# Patient Record
Sex: FEMALE | Race: BLACK OR AFRICAN AMERICAN | URBAN - METROPOLITAN AREA
[De-identification: names, ages, dates, MRNs, and addresses within clinical notes are randomized per-mention and may not be internally consistent; named-entity substitution may affect disease eponyms.]

---

## 2018-05-22 ENCOUNTER — OFFICE VISIT (OUTPATIENT)
Dept: ORTHOPEDIC SURGERY | Age: 73
End: 2018-05-22

## 2018-05-22 VITALS
RESPIRATION RATE: 14 BRPM | SYSTOLIC BLOOD PRESSURE: 178 MMHG | BODY MASS INDEX: 32.32 KG/M2 | WEIGHT: 182.4 LBS | HEART RATE: 58 BPM | TEMPERATURE: 98.2 F | DIASTOLIC BLOOD PRESSURE: 81 MMHG | HEIGHT: 63 IN | OXYGEN SATURATION: 97 %

## 2018-05-22 DIAGNOSIS — M17.0 ARTHRITIS OF BOTH KNEES: ICD-10-CM

## 2018-05-22 DIAGNOSIS — M25.561 PAIN IN BOTH KNEES, UNSPECIFIED CHRONICITY: Primary | ICD-10-CM

## 2018-05-22 DIAGNOSIS — M25.562 PAIN IN BOTH KNEES, UNSPECIFIED CHRONICITY: Primary | ICD-10-CM

## 2018-05-22 DIAGNOSIS — M25.462 EFFUSION OF KNEE JOINT, LEFT: ICD-10-CM

## 2018-05-22 RX ORDER — METFORMIN HYDROCHLORIDE 500 MG/1
500 TABLET, EXTENDED RELEASE ORAL 2 TIMES DAILY
COMMUNITY
Start: 2018-02-27

## 2018-05-22 RX ORDER — QUINAPRIL 20 MG/1
20 TABLET ORAL DAILY
COMMUNITY
Start: 2018-02-27

## 2018-05-22 RX ORDER — FLUTICASONE PROPIONATE 50 MCG
2 SPRAY, SUSPENSION (ML) NASAL DAILY
COMMUNITY
Start: 2018-02-22

## 2018-05-22 RX ORDER — TRIAMCINOLONE ACETONIDE 40 MG/ML
40 INJECTION, SUSPENSION INTRA-ARTICULAR; INTRAMUSCULAR ONCE
Qty: 1 ML | Refills: 0
Start: 2018-05-22 | End: 2018-05-22

## 2018-05-22 RX ORDER — ACETAMINOPHEN 500 MG
TABLET ORAL
COMMUNITY

## 2018-05-22 RX ORDER — HYDROCHLOROTHIAZIDE 25 MG/1
25 TABLET ORAL DAILY
COMMUNITY
Start: 2018-02-27

## 2018-05-22 RX ORDER — VERAPAMIL HYDROCHLORIDE 240 MG/1
240 CAPSULE, EXTENDED RELEASE ORAL DAILY
COMMUNITY
Start: 2018-03-01

## 2018-05-22 RX ORDER — LORATADINE 10 MG/1
10 TABLET ORAL DAILY
COMMUNITY

## 2018-05-22 RX ORDER — SIMVASTATIN 20 MG/1
TABLET, FILM COATED ORAL
COMMUNITY

## 2018-05-22 RX ORDER — LEVETIRACETAM 750 MG/1
750 TABLET ORAL 2 TIMES DAILY
COMMUNITY
Start: 2018-02-27

## 2018-05-22 RX ORDER — METOPROLOL TARTRATE 50 MG/1
50 TABLET ORAL 2 TIMES DAILY
COMMUNITY
Start: 2018-02-27

## 2018-05-22 NOTE — PROGRESS NOTES
HISTORY OF PRESENT ILLNESS: Endy Fischer is here for consultation regarding particularly left knee pain. She has known rheumatoid arthritis. She has had one previous Cortisone injection to her knee by a Dr. Luiz Fraser. This helped her a lot. This was many years ago. She now notices swelling in her left knee. She does notice pain with weightbearing. She also has some pain at rest. She states she has known arthritis in her knees but one doctor told her a long time ago never have surgery on your knees. She does use a cane for ambulation assistance. She does not wear a brace on her left knee. PHYSICAL EXAMINATION:  Clinical examination today reveals a 62-year-old, -American female in discomfort. She does ambulate with an antalgic gait with decreased stance phase on the left leg. With reference to her right knee, she has a slight varus deformity of her right knee, which is not quite correctable to the neutral position passively. She has slight palpable medial joint line tenderness. She has no palpable lateral joint line tenderness. She has crepitus in the patellofemoral area of the right knee with flexion and extension. She has good collateral, as well as cruciate ligament stability of the right knee. Gwens test is negative. Lachman test is negative. Anterior and posterior drawer tests are negative. Right hip roll test is negative. With reference to her left knee, she has a significant effusion of her left knee. She lacks about 5? of full active extension due to this effusion. She has further flexion of about 95?, and flexion beyond this is limited secondary to the effusion. She has palpable medial joint line tenderness. She has good collateral, as well as cruciate ligament stability of the left knee. Gwens test is negative. Lachman test is negative. Anterior and posterior drawer tests are negative. She has no left calf tenderness or swelling.  Neurovascular testing is intact to the left lower extremity. Left hip roll test is negative. RADIOGRAPHS: X-rays of both knees today reveal osteoarthritis of both knees. She has osteoarthritis involving the medial compartment of both knees. She has close to bone-on-bone opposition in the medial compartment of both knees on standing AP radiographs and slight radiographic genu varus deformities. IMPRESSION:   1. Osteoarthritis of both knees, medial compartment. 2. Synovitis, left knee, with effusion, left knee. RECOMMENDATIONS:  She does not want any surgical intervention with reference to her knees. I have discussed this may be necessary in the future if her pain persists. I have recommended, however, aspiration and Cortisone injection of her left knee today. Under sterile technique today, following her permission, I aspirated her left knee of about 60 cc of clear joint fluid and then injected her knee with Kenalog. She felt much better after this aspiration. She will return to see me in about four to six weeks. We will see how her arthritic symptoms are at that time. All of her questions were answered today. Vitals:    05/22/18 0950   BP: 178/81   Pulse: (!) 58   Resp: 14   Temp: 98.2 °F (36.8 °C)   TempSrc: Oral   SpO2: 97%   Weight: 182 lb 6.4 oz (82.7 kg)   Height: 5' 2.5\" (1.588 m)   PainSc:   5   PainLoc: Knee       There is no problem list on file for this patient. There are no active problems to display for this patient. Current Outpatient Prescriptions   Medication Sig Dispense Refill    fluticasone (FLONASE) 50 mcg/actuation nasal spray 2 Sprays by Both Nostrils route daily.  hydroCHLOROthiazide (HYDRODIURIL) 25 mg tablet Take 25 mg by mouth daily.  levETIRAcetam (KEPPRA) 750 mg tablet Take 750 mg by mouth two (2) times a day.  metFORMIN ER (GLUCOPHAGE XR) 500 mg tablet Take 500 mg by mouth two (2) times a day.  metoprolol tartrate (LOPRESSOR) 50 mg tablet Take 50 mg by mouth two (2) times a day.  quinapril (ACCUPRIL) 20 mg tablet Take 20 mg by mouth daily.  verapamil ER (VERELAN) 240 mg ER capsule Take 240 mg by mouth daily.  simvastatin (ZOCOR) 20 mg tablet Take  by mouth nightly.  acetaminophen (TYLENOL EXTRA STRENGTH) 500 mg tablet Take  by mouth every six (6) hours as needed for Pain.  loratadine (CLARITIN) 10 mg tablet Take 10 mg by mouth daily.        No Known Allergies  Past Medical History:   Diagnosis Date    Diabetes ([address]     GERD (gastroesophageal reflux disease)     Hypertension     Seizures (HCC)      Past Surgical History:   Procedure Laterality Date    HX APPENDECTOMY      HX HYSTERECTOMY      HX TONSILLECTOMY       Family History   Problem Relation Age of Onset   Patricia Engle Arthritis-osteo Mother     No Known Problems Father      Social History   Substance Use Topics    Smoking status: Never Smoker    Smokeless tobacco: Never Used    Alcohol use No

## 2019-05-29 ENCOUNTER — OFFICE VISIT (OUTPATIENT)
Dept: ORTHOPEDIC SURGERY | Age: 74
End: 2019-05-29

## 2019-05-29 VITALS
DIASTOLIC BLOOD PRESSURE: 77 MMHG | RESPIRATION RATE: 24 BRPM | SYSTOLIC BLOOD PRESSURE: 134 MMHG | OXYGEN SATURATION: 99 % | HEART RATE: 54 BPM | HEIGHT: 62 IN | TEMPERATURE: 97.3 F | WEIGHT: 182 LBS | BODY MASS INDEX: 33.49 KG/M2

## 2019-05-29 DIAGNOSIS — M54.50 LOW BACK PAIN AT MULTIPLE SITES: Primary | ICD-10-CM

## 2019-05-29 DIAGNOSIS — M51.36 DDD (DEGENERATIVE DISC DISEASE), LUMBAR: ICD-10-CM

## 2019-05-29 DIAGNOSIS — M47.817 LUMBOSACRAL SPONDYLOSIS WITHOUT MYELOPATHY: ICD-10-CM

## 2019-05-29 DIAGNOSIS — M54.16 LUMBAR NEURITIS: ICD-10-CM

## 2019-05-29 RX ORDER — DULOXETIN HYDROCHLORIDE 20 MG/1
20 CAPSULE, DELAYED RELEASE ORAL DAILY
Qty: 30 CAP | Refills: 1 | Status: SHIPPED | OUTPATIENT
Start: 2019-05-29

## 2019-05-29 RX ORDER — AMLODIPINE BESYLATE 10 MG/1
TABLET ORAL
COMMUNITY
Start: 2019-05-08

## 2019-05-29 NOTE — PROGRESS NOTES
[facility]  [address], [place]  Phone: [phone]  Fax: [phone]        INITIAL CONSULTATION      HISTORY OF PRESENT ILLNESS:  Carol Wheat is a 72 y.o. female whom is referred from Kelsie Vargas NP secondary to progressive low back pain extending into the BLE in an L5 distribution to the knees since 1999 without specific injury or trauma. She rates her pain 9/10. Pt is a poor historian. Pt denies h/o spinal surgery or PT/chiropractor. She had spinal blocks over a year ago, with transient relief. Pt self-treats with Tylenol with relief. Pt reports bladder urgency x 1 year. Pt denies fever, weight loss, or skin changes. PmHx seizures (well-controlled with Keppra, last seizure was 2016), DM. Note from Kelsie Vargas NP dated 5/8/19 indicating patient was seen with c/o chronic low back pian with bilateral sciatica. Referred to us. Note from Dr. Nicholas Small dated 5/22/18 indicating patient was seen with c/o bilateral knee pain. Lumbar spine MRI dated 5/6/16 reviewed. Per report, mild anterolisthesis secondary to facet arthropathy at L4-L5.  Only mild central stenosis at this level. Degenerative changes as described above.  Focal degenerative changes associated with the anteriorly bulging/protruding disc at L5-S1 with adjacent marrow and retroperitoneal soft tissue changes, felt to be benign and related to degenerative disc disease and not infectious in nature, cannot exclude focal spondyloarthropathy given marrow and soft tissue changes. Additional degenerative change but no additional evidence of herniation or high-grade stenosis. The patient is RHD.  reviewed. Body mass index is 33.29 kg/m².     PCP: Kelsie Vargas NP    Past Medical History:   Diagnosis Date    Diabetes ([facility]     GERD (gastroesophageal reflux disease)     Hypertension     Seizures ([facility]           Past Surgical History:   Procedure Laterality Date    HX APPENDECTOMY  HX HYSTERECTOMY      HX TONSILLECTOMY           Social History     Tobacco Use    Smoking status: Never Smoker    Smokeless tobacco: Never Used   Substance Use Topics    Alcohol use: No     Work status: The patient is unknown. Marital status: The patient is . Current Outpatient Medications   Medication Sig Dispense Refill    amLODIPine (NORVASC) 10 mg tablet       fluticasone (FLONASE) 50 mcg/actuation nasal spray 2 Sprays by Both Nostrils route daily.  hydroCHLOROthiazide (HYDRODIURIL) 25 mg tablet Take 25 mg by mouth daily.  levETIRAcetam (KEPPRA) 750 mg tablet Take 750 mg by mouth two (2) times a day.  metFORMIN ER (GLUCOPHAGE XR) 500 mg tablet Take 500 mg by mouth two (2) times a day.  metoprolol tartrate (LOPRESSOR) 50 mg tablet Take 50 mg by mouth two (2) times a day.  simvastatin (ZOCOR) 20 mg tablet Take  by mouth nightly.  acetaminophen (TYLENOL EXTRA STRENGTH) 500 mg tablet Take  by mouth every six (6) hours as needed for Pain.  loratadine (CLARITIN) 10 mg tablet Take 10 mg by mouth daily.  quinapril (ACCUPRIL) 20 mg tablet Take 20 mg by mouth daily.  verapamil ER (VERELAN) 240 mg ER capsule Take 240 mg by mouth daily. Allergies   Allergen Reactions    Influenza Virus Vaccines Itching            Family History   Problem Relation Age of Onset   Westley Arthritis-osteo Mother     No Known Problems Father          REVIEW OF SYSTEMS  Constitutional symptoms: Negative  Eyes: Negative  Ears, Nose, Throat, and Mouth: Negative  Cardiovascular: Negative  Respiratory: Negative  Genitourinary: Negative  Integumentary (Skin and/or breast): Negative  Musculoskeletal: Positive for low back pain, BLE pain  Extremities: Negative for edema.   Endocrine/Rheumatologic: Negative  Hematologic/Lymphatic: Negative  Allergic/Immunologic: Negative  Psychiatric: Negative       PHYSICAL EXAMINATION  Visit Vitals  /77   Pulse (!) 54   Temp 97.3 °F (36.3 °C) Resp 24   Ht 5' 2\" (1.575 m)   Wt 182 lb (82.6 kg)   SpO2 99%   BMI 33.29 kg/m²       CONSTITUTIONAL: NAD, A&O x 3  HEART: Regular rate and rhythm  ABDOMEN: Positive bowel sounds, soft, nontender, and nondistended  LUNGS: Clear to auscultation bilaterally. SKIN: Negative for rash. RANGE OF MOTION: The patient has full passive range of motion in all four extremities. SENSATION: Sensation is intact to light touch throughout. MOTOR:   Straight Leg Raise: Negative, bilateral  [place]illa: Negative, bilateral  Deep tendon reflexes are 2+ at the brachioradialis, biceps, and triceps. Deep tendon reflexes are 0 at the knees and ankles bilaterally. Shoulder AB/Flex Elbow Flex Wrist Ext Elbow Ext Wrist Flex Hand Intrin Tone   Right +4/5 +4/5 +4/5 +4/5 +4/5 +4/5 +4/5   Left +4/5 +4/5 +4/5 +4/5 +4/5 +4/5 +4/5              Hip Flex Knee Ext Knee Flex Ankle DF GTE Ankle PF Tone   Right +4/5 +4/5 +4/5 +4/5 +4/5 +4/5 +4/5   Left +4/5 +4/5 +4/5 +4/5 +4/5 +4/5 +4/5     RADIOGRAPHS  Preliminary reading of lumbar plain films:  Grade 1 anterolisthesis of L4 on L5. Moderate to severe disc space narrowing at L4-5 and L5-S1. Small anterior osteophytes noted throughout the lumbar spine. No acute pathology identified. These are being sent out for official reading by Dr. Kylah Thayer. ASSESSMENT   Diagnoses and all orders for this visit:    1. Low back pain at multiple sites  -     AMB POC XRAY, SPINE, LUMBOSACRAL; 2 O    2. Lumbosacral spondylosis without myelopathy    3. Lumbar neuritis    4. DDD (degenerative disc disease), lumbar           IMPRESSIONS/RECOMMENDATIONS:  Patient presents today with c/o progressive low back pain extending into the BLE in an L5 distribution to the knees since 1999 without specific injury or trauma. I will refer her to physical therapy with an emphasis on HEP. Declined neuropathic pain medications at this time. Multiple treatment options were discussed. Patient is neurologically intact.  I will see the patient back in 1 month's time or earlier if needed. Written by Aldo Ford, as dictated by Alfred Harris MD  I examined the patient, reviewed and agree with the note.

## 2019-05-29 NOTE — LETTER
5/29/19 Patient: Suresh Shah YOB: 1945 Date of Visit: 5/29/2019 Jimmy Dodge NP 
[address] [facility] [address] VIA Facsimile: [phone] Dear Jimmy Dodge NP, Thank you for referring Ms. Suresh Shah to [address] [place] [address] for evaluation. My notes for this consultation are attached. If you have questions, please do not hesitate to call me. I look forward to following your patient along with you. Sincerely, Germaine Barnett MD

## 2019-05-29 NOTE — PATIENT INSTRUCTIONS

## 2019-05-29 NOTE — PROGRESS NOTES
1. Have you been to the ER, urgent care clinic since your last visit? Hospitalized since your last visit? No    2. Have you seen or consulted any other health care providers outside of the [address] [place] [address] since your last visit? Include any pap smears or colon screening.  No

## 2021-12-29 ENCOUNTER — OFFICE VISIT (OUTPATIENT)
Dept: ORTHOPEDIC SURGERY | Age: 76
End: 2021-12-29
Payer: MEDICARE

## 2021-12-29 VITALS
OXYGEN SATURATION: 100 % | WEIGHT: 158.6 LBS | TEMPERATURE: 98.2 F | HEIGHT: 62 IN | BODY MASS INDEX: 29.19 KG/M2 | HEART RATE: 46 BPM

## 2021-12-29 DIAGNOSIS — M75.101 RIGHT ROTATOR CUFF TEAR ARTHROPATHY: Primary | ICD-10-CM

## 2021-12-29 DIAGNOSIS — M12.811 RIGHT ROTATOR CUFF TEAR ARTHROPATHY: Primary | ICD-10-CM

## 2021-12-29 PROCEDURE — 1090F PRES/ABSN URINE INCON ASSESS: CPT | Performed by: ORTHOPAEDIC SURGERY

## 2021-12-29 PROCEDURE — 3017F COLORECTAL CA SCREEN DOC REV: CPT | Performed by: ORTHOPAEDIC SURGERY

## 2021-12-29 PROCEDURE — G8432 DEP SCR NOT DOC, RNG: HCPCS | Performed by: ORTHOPAEDIC SURGERY

## 2021-12-29 PROCEDURE — G8536 NO DOC ELDER MAL SCRN: HCPCS | Performed by: ORTHOPAEDIC SURGERY

## 2021-12-29 PROCEDURE — G8427 DOCREV CUR MEDS BY ELIG CLIN: HCPCS | Performed by: ORTHOPAEDIC SURGERY

## 2021-12-29 PROCEDURE — G8419 CALC BMI OUT NRM PARAM NOF/U: HCPCS | Performed by: ORTHOPAEDIC SURGERY

## 2021-12-29 PROCEDURE — 1101F PT FALLS ASSESS-DOCD LE1/YR: CPT | Performed by: ORTHOPAEDIC SURGERY

## 2021-12-29 PROCEDURE — G8400 PT W/DXA NO RESULTS DOC: HCPCS | Performed by: ORTHOPAEDIC SURGERY

## 2021-12-29 PROCEDURE — 99214 OFFICE O/P EST MOD 30 MIN: CPT | Performed by: ORTHOPAEDIC SURGERY

## 2021-12-29 NOTE — PROGRESS NOTES
Patient: Dale Mcelroy                MRN: [id number]       SSN: [id number]  YOB: 1945        AGE: 78 y.o. SEX: female  Body mass index is 29.01 kg/m². PCP: Lillie Hollis NP  12/29/21    CHIEF COMPLAINT: Right shoulder pain 10/10    HPI: Dale Mcelroy is a 78 y.o. female patient who presents to the office today with right shoulder pain. She has been having worsening right shoulder pain for the past year. She did have a surgery on the right shoulder she believes in 1999 although she not exactly sure of the date. The pain is worse when she tries to use her arm. She does have pain at night. She denies any numbness or tingling. No recent injury. She was seen with x-rays earlier this year at an outside institution. Past Medical History:   Diagnosis Date    Diabetes ([address]     GERD (gastroesophageal reflux disease)     Hypertension     Right shoulder pain     Seizures (HCC)        Family History   Problem Relation Age of Onset    OSTEOARTHRITIS Mother     No Known Problems Father        Current Outpatient Medications   Medication Sig Dispense Refill    amLODIPine (NORVASC) 10 mg tablet       DULoxetine (CYMBALTA) 20 mg capsule Take 1 Cap by mouth daily. 30 Cap 1    fluticasone (FLONASE) 50 mcg/actuation nasal spray 2 Sprays by Both Nostrils route daily.  hydroCHLOROthiazide (HYDRODIURIL) 25 mg tablet Take 25 mg by mouth daily.  levETIRAcetam (KEPPRA) 750 mg tablet Take 750 mg by mouth two (2) times a day.  metFORMIN ER (GLUCOPHAGE XR) 500 mg tablet Take 500 mg by mouth two (2) times a day.  metoprolol tartrate (LOPRESSOR) 50 mg tablet Take 50 mg by mouth two (2) times a day.  quinapril (ACCUPRIL) 20 mg tablet Take 20 mg by mouth daily.  verapamil ER (VERELAN) 240 mg ER capsule Take 240 mg by mouth daily.  simvastatin (ZOCOR) 20 mg tablet Take  by mouth nightly.       acetaminophen (TYLENOL EXTRA STRENGTH) 500 mg tablet Take  by mouth every six (6) hours as needed for Pain.  loratadine (CLARITIN) 10 mg tablet Take 10 mg by mouth daily. Allergies   Allergen Reactions    Influenza Virus Vaccines Itching       Past Surgical History:   Procedure Laterality Date    HX APPENDECTOMY      HX HYSTERECTOMY      HX TONSILLECTOMY      MA ANESTH,SURGERY OF SHOULDER Right     pinning       Social History     Socioeconomic History    Marital status:      Spouse name: Not on file    Number of children: Not on file    Years of education: Not on file    Highest education level: Not on file   Occupational History    Occupation: Retired   Tobacco Use    Smoking status: Never Smoker    Smokeless tobacco: Never Used   Substance and Sexual Activity    Alcohol use: No    Drug use: Not on file    Sexual activity: Not on file   Other Topics Concern   [address] Service Not Asked    Blood Transfusions Not Asked    Caffeine Concern Not Asked    Occupational Exposure Not Asked    Hobby Hazards Not Asked    Sleep Concern Not Asked    Stress Concern Not Asked    Weight Concern Not Asked    Special Diet Not Asked    Back Care Not Asked    Exercise Not Asked    Bike Helmet Not Asked    Seat Belt Not Asked    Self-Exams Not Asked   Social History Narrative    Not on file     Social Determinants of Health     Financial Resource Strain:     Difficulty of Paying Living Expenses: Not on file   Food Insecurity:     Worried About Running Out of Food in the Last Year: Not on file    Vanesa of Food in the Last Year: Not on file   Transportation Needs:     Lack of Transportation (Medical): Not on file    Lack of Transportation (Non-Medical):  Not on file   Physical Activity:     Days of Exercise per Week: Not on file    Minutes of Exercise per Session: Not on file   Stress:     Feeling of Stress : Not on file   Social Connections:     Frequency of Communication with Friends and Family: Not on file    Frequency of Social Gatherings with Friends and Family: Not on file    Attends Confucianist Services: Not on file    Active Member of Clubs or Organizations: Not on file    Attends Club or Organization Meetings: Not on file    Marital Status: Not on file   Intimate Partner Violence:     Fear of Current or Ex-Partner: Not on file    Emotionally Abused: Not on file    Physically Abused: Not on file    Sexually Abused: Not on file   Housing Stability:     Unable to Pay for Housing in the Last Year: Not on file    Number of [place] in the Last Year: Not on file    Unstable Housing in the Last Year: Not on file       REVIEW OF SYSTEMS:      CON: negative for recent weight loss/gain, fever, or chills  EYE: negative for double or blurry vision  ENT: negative for hoarseness  RS:   negative for cough, URI, SOB  CV:  negative for chest pain, palpitations  GI:    negative for blood in stool, nausea/vomiting  :  negative for blood in urine  MS: As per HPI  Other systems reviewed and noted below. PHYSICAL EXAMINATION:  Visit Vitals  Pulse (!) 46   Temp 98.2 °F (36.8 °C)   Ht 5' 2\" (1.575 m)   Wt 158 lb 9.6 oz (71.9 kg)   SpO2 100%   BMI 29.01 kg/m²     Body mass index is 29.01 kg/m². GENERAL: Alert and oriented x3, in no acute distress, well-developed, well-nourished. HEENT: Normocephalic, atraumatic. RESP: Non labored breathing with equal chest rise on inspiration. CV: Well perfused extremities. No cyanosis or clubbing noted. ABDOMEN: Soft, non-tender, non-distended.    Shoulder Examination     R   L  ROM   FF  60/150   Full  ER  30/40   Full   IR  Full   Full  Rotator Cuff Pain   Supra  +   -   Infra  +   -   Subscap -   -  Crepitus  +   -  Effusion  -   -  Warmth  -   -   Erythema  -   -  Instability  -   -  AC Joint TTP  -   -  Clavicle   Deformity -   -   TTP  -   -  Proximal Humerus   Deformity -   -   TTP  -   -  Deltoid Strength 5   5  Biceps Strength 5   5  Biceps Deformity -   -  Biceps Groove Pain -   -  Impingement Sign -   -   Healed incision over the acromion      IMAGING:  Previously taken x-rays were reviewed the office which show evidence of a previous acromion open reduction internal fixation with hardware in place. There is also evidence of a previous distal clavicle resection. No fractures however there is a decreased acromiohumeral interval and evidence of rotator cuff arthropathy. ASSESSMENT & PLAN  Diagnosis: Right shoulder rotator cuff arthropathy    Alejandra has likely right shoulder rotator cuff arthropathy. I discussed multiple treatment options with her today including a home exercise program, formal physical therapy, corticosteroid injection, and reverse shoulder arthroplasty. At this point she would like to try home exercise program. Exercises were given to her. She will follow-up as needed. Electronically signed by: Patricia Chavez MD    Note: This note was completed using voice recognition software.   Any typographical/name errors or mistakes are unintentional.